# Patient Record
Sex: FEMALE | Race: BLACK OR AFRICAN AMERICAN | NOT HISPANIC OR LATINO | Employment: OTHER | URBAN - METROPOLITAN AREA
[De-identification: names, ages, dates, MRNs, and addresses within clinical notes are randomized per-mention and may not be internally consistent; named-entity substitution may affect disease eponyms.]

---

## 2017-01-20 ENCOUNTER — TELEPHONE (OUTPATIENT)
Dept: OBSTETRICS AND GYNECOLOGY | Facility: CLINIC | Age: 37
End: 2017-01-20

## 2017-01-20 NOTE — TELEPHONE ENCOUNTER
----- Message from Alen Dent sent at 1/20/2017  1:58 PM CST -----  Please call Pharm [facility] MEDICATION USE DOC'S NPI FOR A REF # [phone]

## 2017-09-12 ENCOUNTER — HOSPITAL ENCOUNTER (EMERGENCY)
Facility: OTHER | Age: 37
Discharge: HOME | End: 2017-09-12
Attending: EMERGENCY MEDICINE
Payer: MEDICAID

## 2017-09-12 VITALS
DIASTOLIC BLOOD PRESSURE: 54 MMHG | RESPIRATION RATE: 17 BRPM | HEART RATE: 76 BPM | TEMPERATURE: 98 F | SYSTOLIC BLOOD PRESSURE: 115 MMHG | OXYGEN SATURATION: 100 %

## 2017-09-12 DIAGNOSIS — R40.0 SOMNOLENCE: ICD-10-CM

## 2017-09-12 DIAGNOSIS — F11.90 OPIATE USE: ICD-10-CM

## 2017-09-12 DIAGNOSIS — R40.4 TRANSIENT ALTERATION OF AWARENESS: Primary | ICD-10-CM

## 2017-09-12 LAB
AMPHET+METHAMPHET UR QL: NEGATIVE
ANION GAP SERPL CALC-SCNC: 9 MMOL/L
B-HCG UR QL: NEGATIVE
BARBITURATES UR QL SCN>200 NG/ML: NEGATIVE
BENZODIAZ UR QL SCN>200 NG/ML: NORMAL
BUN SERPL-MCNC: 12 MG/DL
BZE UR QL SCN: NEGATIVE
CALCIUM SERPL-MCNC: 8.9 MG/DL
CANNABINOIDS UR QL SCN: NORMAL
CHLORIDE SERPL-SCNC: 108 MMOL/L
CO2 SERPL-SCNC: 22 MMOL/L
CREAT SERPL-MCNC: 0.7 MG/DL
CREAT UR-MCNC: 201.5 MG/DL
CTP QC/QA: YES
EST. GFR  (AFRICAN AMERICAN): >60 ML/MIN/1.73 M^2
EST. GFR  (NON AFRICAN AMERICAN): >60 ML/MIN/1.73 M^2
ETHANOL SERPL-MCNC: <10 MG/DL
GLUCOSE SERPL-MCNC: 93 MG/DL
METHADONE UR QL SCN>300 NG/ML: NEGATIVE
OPIATES UR QL SCN: NEGATIVE
PCP UR QL SCN>25 NG/ML: NEGATIVE
POTASSIUM SERPL-SCNC: 4.8 MMOL/L
SODIUM SERPL-SCNC: 139 MMOL/L
TOXICOLOGY INFORMATION: NORMAL

## 2017-09-12 PROCEDURE — 99283 EMERGENCY DEPT VISIT LOW MDM: CPT

## 2017-09-12 PROCEDURE — 80320 DRUG SCREEN QUANTALCOHOLS: CPT

## 2017-09-12 PROCEDURE — 81025 URINE PREGNANCY TEST: CPT | Performed by: EMERGENCY MEDICINE

## 2017-09-12 PROCEDURE — 25000003 PHARM REV CODE 250: Performed by: EMERGENCY MEDICINE

## 2017-09-12 PROCEDURE — 80307 DRUG TEST PRSMV CHEM ANLYZR: CPT

## 2017-09-12 PROCEDURE — 80048 BASIC METABOLIC PNL TOTAL CA: CPT

## 2017-09-12 RX ORDER — ONDANSETRON 8 MG/1
8 TABLET, ORALLY DISINTEGRATING ORAL
Status: COMPLETED | OUTPATIENT
Start: 2017-09-12 | End: 2017-09-12

## 2017-09-12 RX ADMIN — ONDANSETRON 8 MG: 8 TABLET, ORALLY DISINTEGRATING ORAL at 05:09

## 2017-09-12 NOTE — ED PROVIDER NOTES
Encounter Date: 2017    SCRIBE #1 NOTE: I, Yoly Alvarez, am scribing for, and in the presence of,  Dr. Suárez. I have scribed the entire note.       History     Chief Complaint   Patient presents with    Addiction Problem     Pt to ED via EMS for AMS secondary to suboxone, and clonopin use.     Time seen by provider: 3:57 PM    This is a 37 y.o. female who presents with drug reaction. As per EMS the patient's symptoms began just prior to arrival in the ED. EMS note a bystander states the patient was lethargic. The patient states she is just tired. She admits to using Suboxone and Clonopin. The patient states she typically takes 1 of Clonopin and 1/2 of Suboxone. However, she admits to taking 2 pills of Clonopin. The patient reports she took the medication because 2 pills came from the bottle. She denies taking pills with the intent to harm herself. The patient denies any other illicit drug use or alcohol. She has no other complaints except for being tired and hungry. The patient does not admit to any chest pain, shortness of breath, palpitations, nausea, vomiting, diarrhea, abdominal pain,        The history is provided by the patient and the EMS personnel. The history is limited by the condition of the patient.     Review of patient's allergies indicates:   Allergen Reactions    Doxycycline      Past Medical History:   Diagnosis Date    Bipolar 1 disorder     Diabetes mellitus     Gastroparesis     Hypertension     Schizophrenia      Past Surgical History:   Procedure Laterality Date     SECTION       Family History   Problem Relation Age of Onset    Bone cancer Mother     Ovarian cancer Neg Hx     Diabetes Neg Hx      Social History   Substance Use Topics    Smoking status: Current Every Day Smoker     Packs/day: 0.50     Types: Cigarettes    Smokeless tobacco: Never Used    Alcohol use No     Review of Systems   Constitutional: Positive for fatigue. Negative for chills and fever.    HENT: Negative for congestion and sore throat.    Eyes: Negative for redness and visual disturbance.   Respiratory: Negative for cough and shortness of breath.    Cardiovascular: Negative for chest pain and palpitations.   Gastrointestinal: Negative for abdominal pain, diarrhea, nausea and vomiting.   Genitourinary: Negative for dysuria.   Musculoskeletal: Negative for back pain.   Skin: Negative for rash.   Neurological: Negative for weakness and headaches.   Psychiatric/Behavioral: Negative for confusion.       Physical Exam     Initial Vitals [09/12/17 1526]   BP Pulse Resp Temp SpO2   106/60 74 18 -- 99 %      MAP       75.33         Physical Exam    Nursing note and vitals reviewed.  Constitutional: She appears well-developed and well-nourished. She is not diaphoretic. No distress.   Responds to verbal stimulus. No odor of alcohol. No slurred speech.    HENT:   Head: Normocephalic and atraumatic.   Right Ear: External ear normal.   Left Ear: External ear normal.   Eyes: Conjunctivae and EOM are normal.   Pupils are 2 mm and sluggish   Neck: Normal range of motion. Neck supple.   Cardiovascular: Normal rate, regular rhythm and normal heart sounds. Exam reveals no gallop and no friction rub.    No murmur heard.  Pulmonary/Chest: Breath sounds normal. She has no wheezes. She has no rhonchi. She has no rales.   Abdominal: Soft. Bowel sounds are normal. There is no tenderness. There is no rebound and no guarding.   Musculoskeletal: Normal range of motion. She exhibits no edema or tenderness.   Lymphadenopathy:     She has no cervical adenopathy.   Neurological: She has normal strength.   Alert to person   Skin: Skin is warm and dry. No rash noted.         ED Course   Procedures  Labs Reviewed   BASIC METABOLIC PANEL - Abnormal; Notable for the following:        Result Value    CO2 22 (*)     All other components within normal limits   DRUG SCREEN PANEL, URINE EMERGENCY   ALCOHOL,MEDICAL (ETHANOL)   CBC W/ AUTO  DIFFERENTIAL   POCT URINE PREGNANCY             Medical Decision Making:   ED Management:  4:05 PM Called [facility] to speak with Dr. Kwan of psychiatry. Spoke with Evelin nurse of  Dr. Kwan, that makes home visits to the patient, went for routine visit, when the patient opened the door, the patient was lethargic, had pressure of 80/60, pupils were pinpoint. The patient told nurse she had taken Suboxone, but told Dr. Kwan she was out of the medication and took  4 Vicodin instead. The nurse states she and the doctor then went to [facility] to buy narcan but the pharmacy did not have the medication, so they returned to the house and found the patient to be even more hypotensive. Had concern the patient would continue to be drowsy, so called EMS    Additional MDM:   Comments: 38 y/o female BIB 2/2 increased somnolence at home today during a visit from Dr. Kwan and his nurse Evelin.  The patient had no complaints other than feeling hungry and sleepy.  She admitted to me that she took 1/2 a tab of suboxone at 9:26 am and 2 1mg tabs of klonopin ~ 1 hour prior to arrival.  The patient was observed on the cardiac monitor throughout her Ed stay without any complications.  She only required zofran for nausea.  Labs were obtained and without significant abnormalities.      Of note, Evelin did provide the history that the patient's child (<1 year old) was in the house alone with her when they arrived and found her somnolent.  The child was left with her sister who lives next door.  The patient reports having 4 children and she is the only adult in the home.  She made comments to the nurse in the ED about leaving the children in the closet at times among other things.  Please see the nurses notes for further details.  CPS was called (case # [id number]) and our charge nurse was told she would be a high priority case.  The patient was d/c'ed home in stable condition.    .          Scribe Attestation:   Scribe #1: I  performed the above scribed service and the documentation accurately describes the services I performed. I attest to the accuracy of the note.    Attending Attestation:           Physician Attestation for Scribe:  Physician Attestation Statement for Scribe #1: I, Dr. Suárez, reviewed documentation, as scribed by Yoly Alvarez in my presence, and it is both accurate and complete.                 ED Course      Clinical Impression:     1. Transient alteration of awareness    2. Somnolence    3. Opiate use                               Jolene Suárez MD  09/12/17 1311

## 2017-09-12 NOTE — ED NOTES
"Upon discharging patient, pt reports she needed to hurry home because her sister who was watching her youngest child left for work and left the baby with pts oldest son because her sister had to go to work. Pt states she has 4 children, oldest of which is 14 years old. Pt states "i'm scared as shit because I already have a case with CPS" related to drugs.   "

## 2017-09-12 NOTE — ED NOTES
CPS states to DC patient, and they will make a priority 1 case, and send staff to patient resident.

## 2017-09-12 NOTE — ED TRIAGE NOTES
"Pt arrived via EMS for lethargy. EMS called by [facility] ACT team while at pt's home (EMS provided team's business card). Pt reported to EMS she took x2 klonapin and x2 suboxone about 1 hr PTA. EMS reports no HI or SI. EMS also reports there was a 9 month old at the house and a neighbor is currently caring for the child. Pt reports at 0600 today she started with "falling down because I wanted to sleep". Pt also reports generalized body pain and nausea. Reports she takes suboxone for "tylenol PM and pain killer" addiction. Denies attempting to injure herself today. Denies any recreational drug use or alcohol use today. Pt reports being "sleepy and hungry". Pt falling asleep during the assessment, pt responds to voice, answers questions appropriately, follows commands. VSS, will continue to monitor. Unable to complete all assessments due to pts lethargic condition.   "

## 2017-09-12 NOTE — ED NOTES
Pt more alert and requesting to go home. Pt also requesting something for her nausea. MD informed and requested pt be ambulated. MD will order zofran. Pt ambulated without difficulty. Will complete patients assessments at this time

## 2017-12-13 ENCOUNTER — HOSPITAL ENCOUNTER (EMERGENCY)
Facility: OTHER | Age: 37
Discharge: HOME | End: 2017-12-13
Attending: EMERGENCY MEDICINE
Payer: MEDICAID

## 2017-12-13 VITALS
RESPIRATION RATE: 18 BRPM | HEART RATE: 72 BPM | WEIGHT: 159 LBS | SYSTOLIC BLOOD PRESSURE: 121 MMHG | TEMPERATURE: 98 F | DIASTOLIC BLOOD PRESSURE: 57 MMHG | HEIGHT: 64 IN | BODY MASS INDEX: 27.14 KG/M2 | OXYGEN SATURATION: 98 %

## 2017-12-13 DIAGNOSIS — O26.899 ABDOMINAL PAIN AFFECTING PREGNANCY: ICD-10-CM

## 2017-12-13 DIAGNOSIS — R10.9 ABDOMINAL PAIN AFFECTING PREGNANCY: ICD-10-CM

## 2017-12-13 DIAGNOSIS — Z3A.11 11 WEEKS GESTATION OF PREGNANCY: Primary | ICD-10-CM

## 2017-12-13 LAB
ABO + RH BLD: NORMAL
ALBUMIN SERPL BCP-MCNC: 4.1 G/DL
ALP SERPL-CCNC: 57 U/L
ALT SERPL W/O P-5'-P-CCNC: 9 U/L
AMPHET+METHAMPHET UR QL: NEGATIVE
ANION GAP SERPL CALC-SCNC: 11 MMOL/L
AST SERPL-CCNC: 14 U/L
B-HCG UR QL: POSITIVE
BACTERIA #/AREA URNS HPF: ABNORMAL /HPF
BARBITURATES UR QL SCN>200 NG/ML: NEGATIVE
BASOPHILS # BLD AUTO: 0.01 K/UL
BASOPHILS NFR BLD: 0.1 %
BENZODIAZ UR QL SCN>200 NG/ML: NEGATIVE
BILIRUB SERPL-MCNC: 0.2 MG/DL
BILIRUB UR QL STRIP: NEGATIVE
BLD GP AB SCN CELLS X3 SERPL QL: NORMAL
BUN SERPL-MCNC: 8 MG/DL
BZE UR QL SCN: NEGATIVE
CALCIUM SERPL-MCNC: 9.5 MG/DL
CANNABINOIDS UR QL SCN: NORMAL
CHLORIDE SERPL-SCNC: 103 MMOL/L
CLARITY UR: ABNORMAL
CO2 SERPL-SCNC: 24 MMOL/L
COLOR UR: YELLOW
CREAT SERPL-MCNC: 0.7 MG/DL
CREAT UR-MCNC: 176.1 MG/DL
CTP QC/QA: YES
DIFFERENTIAL METHOD: ABNORMAL
EOSINOPHIL # BLD AUTO: 0.4 K/UL
EOSINOPHIL NFR BLD: 4 %
ERYTHROCYTE [DISTWIDTH] IN BLOOD BY AUTOMATED COUNT: 13.8 %
EST. GFR  (AFRICAN AMERICAN): >60 ML/MIN/1.73 M^2
EST. GFR  (NON AFRICAN AMERICAN): >60 ML/MIN/1.73 M^2
ETHANOL UR-MCNC: <10 MG/DL
GLUCOSE SERPL-MCNC: 98 MG/DL
GLUCOSE UR QL STRIP: NEGATIVE
HCG INTACT+B SERPL-ACNC: NORMAL MIU/ML
HCT VFR BLD AUTO: 36.4 %
HGB BLD-MCNC: 12.4 G/DL
HGB UR QL STRIP: ABNORMAL
HYALINE CASTS #/AREA URNS LPF: 0 /LPF
KETONES UR QL STRIP: ABNORMAL
LEUKOCYTE ESTERASE UR QL STRIP: NEGATIVE
LIPASE SERPL-CCNC: 13 U/L
LYMPHOCYTES # BLD AUTO: 2.9 K/UL
LYMPHOCYTES NFR BLD: 32.9 %
MCH RBC QN AUTO: 28 PG
MCHC RBC AUTO-ENTMCNC: 34.1 G/DL
MCV RBC AUTO: 82 FL
METHADONE UR QL SCN>300 NG/ML: NEGATIVE
MICROSCOPIC COMMENT: ABNORMAL
MONOCYTES # BLD AUTO: 0.6 K/UL
MONOCYTES NFR BLD: 6.7 %
NEUTROPHILS # BLD AUTO: 5 K/UL
NEUTROPHILS NFR BLD: 56.1 %
NITRITE UR QL STRIP: NEGATIVE
OPIATES UR QL SCN: NEGATIVE
PCP UR QL SCN>25 NG/ML: NEGATIVE
PH UR STRIP: 5 [PH] (ref 5–8)
PLATELET # BLD AUTO: 266 K/UL
PMV BLD AUTO: 9.8 FL
POTASSIUM SERPL-SCNC: 4.1 MMOL/L
PROT SERPL-MCNC: 8.4 G/DL
PROT UR QL STRIP: ABNORMAL
RBC # BLD AUTO: 4.43 M/UL
RBC #/AREA URNS HPF: 2 /HPF (ref 0–4)
SODIUM SERPL-SCNC: 138 MMOL/L
SP GR UR STRIP: >=1.03 (ref 1–1.03)
SQUAMOUS #/AREA URNS HPF: 31 /HPF
TOXICOLOGY INFORMATION: NORMAL
URN SPEC COLLECT METH UR: ABNORMAL
UROBILINOGEN UR STRIP-ACNC: NEGATIVE EU/DL
WBC # BLD AUTO: 8.84 K/UL
WBC #/AREA URNS HPF: 5 /HPF (ref 0–5)

## 2017-12-13 PROCEDURE — 84702 CHORIONIC GONADOTROPIN TEST: CPT

## 2017-12-13 PROCEDURE — 85025 COMPLETE CBC W/AUTO DIFF WBC: CPT

## 2017-12-13 PROCEDURE — 96361 HYDRATE IV INFUSION ADD-ON: CPT

## 2017-12-13 PROCEDURE — 99284 EMERGENCY DEPT VISIT MOD MDM: CPT | Mod: 25

## 2017-12-13 PROCEDURE — 25000003 PHARM REV CODE 250: Performed by: PHYSICIAN ASSISTANT

## 2017-12-13 PROCEDURE — 81025 URINE PREGNANCY TEST: CPT | Performed by: EMERGENCY MEDICINE

## 2017-12-13 PROCEDURE — 80053 COMPREHEN METABOLIC PANEL: CPT

## 2017-12-13 PROCEDURE — 63600175 PHARM REV CODE 636 W HCPCS: Performed by: PHYSICIAN ASSISTANT

## 2017-12-13 PROCEDURE — 86900 BLOOD TYPING SEROLOGIC ABO: CPT

## 2017-12-13 PROCEDURE — 81000 URINALYSIS NONAUTO W/SCOPE: CPT

## 2017-12-13 PROCEDURE — 80307 DRUG TEST PRSMV CHEM ANLYZR: CPT

## 2017-12-13 PROCEDURE — 86850 RBC ANTIBODY SCREEN: CPT

## 2017-12-13 PROCEDURE — 96374 THER/PROPH/DIAG INJ IV PUSH: CPT

## 2017-12-13 PROCEDURE — 83690 ASSAY OF LIPASE: CPT

## 2017-12-13 RX ORDER — ONDANSETRON 4 MG/1
4 TABLET, ORALLY DISINTEGRATING ORAL
Status: DISCONTINUED | OUTPATIENT
Start: 2017-12-13 | End: 2017-12-13

## 2017-12-13 RX ORDER — ONDANSETRON 4 MG/1
4 TABLET, FILM COATED ORAL EVERY 6 HOURS
Qty: 12 TABLET | Refills: 0 | Status: SHIPPED | OUTPATIENT
Start: 2017-12-13 | End: 2018-01-29

## 2017-12-13 RX ORDER — SODIUM CHLORIDE 9 MG/ML
125 INJECTION, SOLUTION INTRAVENOUS CONTINUOUS
Status: DISCONTINUED | OUTPATIENT
Start: 2017-12-13 | End: 2017-12-13

## 2017-12-13 RX ORDER — ACETAMINOPHEN 500 MG
500 TABLET ORAL
Status: COMPLETED | OUTPATIENT
Start: 2017-12-13 | End: 2017-12-13

## 2017-12-13 RX ORDER — ONDANSETRON 2 MG/ML
4 INJECTION INTRAMUSCULAR; INTRAVENOUS
Status: COMPLETED | OUTPATIENT
Start: 2017-12-13 | End: 2017-12-13

## 2017-12-13 RX ORDER — LORAZEPAM 2 MG/ML
INJECTION INTRAMUSCULAR
Status: DISCONTINUED
Start: 2017-12-13 | End: 2017-12-13 | Stop reason: WASHOUT

## 2017-12-13 RX ADMIN — SODIUM CHLORIDE 1000 ML: 0.9 INJECTION, SOLUTION INTRAVENOUS at 02:12

## 2017-12-13 RX ADMIN — ACETAMINOPHEN 500 MG: 500 TABLET ORAL at 02:12

## 2017-12-13 RX ADMIN — ONDANSETRON 4 MG: 2 INJECTION INTRAMUSCULAR; INTRAVENOUS at 02:12

## 2017-12-13 NOTE — ED TRIAGE NOTES
Pt states she is about 12 weeks pregnant and has not been seen by MD yet. States left flank pain and left sided low abdominal pain since yesterday. States feels like labor pain but pt is not bleeding. Denies difficulty urinating.

## 2017-12-13 NOTE — ED NOTES
Upon entering room pt with tense posturing with fixed stare and pin point pupils. Sternum rub performed with limited responsiveness. Staff and PA notified while maintaining safe environment. Episode lasted approx 10 seconds before pt regained relaxed muscle tone, ability to speak, and follow commands. PA at bedside. MD notified.

## 2017-12-13 NOTE — ED PROVIDER NOTES
Encounter Date: 2017       History     Chief Complaint   Patient presents with    GI Problem     n/v/d x 2 days. reports fever of 101.1. generalized abd pain.      Patient is a 37-year-old , approximately 12 weeks gestational age, with bipolar disorder, diabetes, and hypertension who presents to the ED with abdominal pain.  She reports acute onset of bilateral lower abdominal cramping since last night.  She states the pain became worse at 2 AM this morning. She also reports nausea, vomiting, and diarrhea since yesterday.  She states she has vomited 4 times today. She reports one episode of diarrhea today.  She denies bloody emesis or bloody stools.  She states she has not established prenatal care. She denies vaginal bleeding or vaginal discharge.  She states she is taking Suboxone and last took it today.       The history is provided by the patient.     Review of patient's allergies indicates:   Allergen Reactions    Doxycycline      Past Medical History:   Diagnosis Date    Bipolar 1 disorder     Diabetes mellitus     Gastroparesis     Hypertension     Schizophrenia      Past Surgical History:   Procedure Laterality Date     SECTION       Family History   Problem Relation Age of Onset    Bone cancer Mother     Ovarian cancer Neg Hx     Diabetes Neg Hx      Social History   Substance Use Topics    Smoking status: Current Every Day Smoker     Packs/day: 0.50     Types: Cigarettes    Smokeless tobacco: Never Used    Alcohol use No     Review of Systems   Constitutional: Negative for chills and fever.   HENT: Negative for congestion and sore throat.    Eyes: Negative for pain.   Respiratory: Negative for shortness of breath.    Cardiovascular: Negative for chest pain.   Gastrointestinal: Positive for abdominal pain, diarrhea, nausea and vomiting.   Genitourinary: Negative for dysuria.   Musculoskeletal: Negative for arthralgias.   Skin: Negative for rash.   Neurological: Negative for  headaches.       Physical Exam     Initial Vitals [12/13/17 1341]   BP Pulse Resp Temp SpO2   (!) 170/85 84 18 97.8 °F (36.6 °C) 98 %      MAP       113.33         Physical Exam    Constitutional: She is cooperative.   -American female who appears to be in distress.  She is tearful during the exam.   HENT:   Head: Normocephalic and atraumatic.   Mouth/Throat: Oropharynx is clear and moist.   Eyes: Conjunctivae and EOM are normal. Pupils are equal, round, and reactive to light.   Neck: Normal range of motion. Neck supple.   Cardiovascular: Normal rate, regular rhythm and intact distal pulses.   No murmur heard.  Pulmonary/Chest: Breath sounds normal. She has no wheezes. She has no rhonchi. She has no rales.   Abdominal: Soft. Bowel sounds are normal.       Tenderness to palpation to bilateral lower abdomen.  No rebound tenderness or guarding.    Musculoskeletal: Normal range of motion. She exhibits no edema.   Neurological: She is alert and oriented to person, place, and time. She has normal strength. No cranial nerve deficit. GCS eye subscore is 4. GCS verbal subscore is 5. GCS motor subscore is 6.   Skin: Skin is warm and dry. Capillary refill takes less than 2 seconds. No rash noted.   Psychiatric: She has a normal mood and affect. Her behavior is normal.         ED Course   Procedures  Labs Reviewed   URINALYSIS - Abnormal; Notable for the following:        Result Value    Appearance, UA Hazy (*)     Specific Gravity, UA >=1.030 (*)     Protein, UA 2+ (*)     Ketones, UA 1+ (*)     Occult Blood UA Trace (*)     All other components within normal limits   CBC W/ AUTO DIFFERENTIAL - Abnormal; Notable for the following:     Hematocrit 36.4 (*)     All other components within normal limits   COMPREHENSIVE METABOLIC PANEL - Abnormal; Notable for the following:     ALT 9 (*)     All other components within normal limits   URINALYSIS MICROSCOPIC - Abnormal; Notable for the following:     Bacteria, UA Many (*)      All other components within normal limits   POCT URINE PREGNANCY - Abnormal; Notable for the following:     POC Preg Test, Ur Positive (*)     All other components within normal limits   TOXICOLOGY SCREEN, URINE, RANDOM (COMPLIANCE)   HCG, QUANTITATIVE, PREGNANCY   LIPASE   TYPE & SCREEN     Imaging Results          US OB Less Than 14 Wks with Transvag(xpd (Final result)  Result time 12/13/17 15:50:48    Final result by Alvin Alvarenga MD (12/13/17 15:50:48)                 Impression:      Single viable intrauterine pregnancy.  Dates by ultrasound 11 weeks 2 days.      Electronically signed by: ALVIN ALVARENGA  Date:     12/13/17  Time:    15:50              Narrative:    HISTORY:  Abdominal pain.  Dates by last menstrual period 10 weeks 4 days.      TECHNIQUE: Transabdominal and transvaginal obstetrical ultrasound of the pelvis     COMPARISON: None    FINDINGS:  Single viable intrauterine pregnancy seen.  Heart rate is 161 bpm.  Crown-rump length measures 4.4 cm.    Gestational sac measures 4.7 cm.  Uterus is normal in appearance without focal abnormality.    Right ovary measures 4.4 x 2.6 x 3.1 cm.  There is appropriate arterial and venous flow.  Left ovary measures 1.1 x 1.6 x 2.6 cm.  There is appropriate arterial and venous flow.  Corpus luteum is identified on the right ovary measuring 2.2 cm.                                    Medical Decision Making:   Initial Assessment:   Urgent evaluation of a 37 y.o. female presenting to the emergency department complaining of abdominal pain. Patient is afebrile, nontoxic appearing and hemodynamically stable.  ED Management:  Patient is very anxious and tearful on exam, with reported abdominal pain.  UPT is positive.  We will workup to rule out ectopic pregnancy.  Also considered possible withdrawal.  Patient states she was taking Suboxone daily.  Will provide IV fluids, Zofran, and Tylenol for her symptoms.   14:51- I was notified that patient might be having a possible  seizure.  Per nurse, Nannette Martin, patient had fixed, pinpoint pupils and was not responding to sternal rub.  Patient appeared to have muscle spasm but was responsive to questions, commands and made eye contact. After patient took several deep breaths, she no longer was exhibiting this activity.  She was on a postictal state.  I do not believe Ativan was needed at this time.  Of note, patient denies any history of seizures or antiepileptic medications. Upon reviewing old records, patient has history of seizures and pseudoseizures.   Lab work reveals no leukocytosis or anemia, no electrolyte disturbances, normal kidney function. Normal lipase. Urinalysis revealed 5 WBCs with 30 squams. Rh +[place] [address].  Ultrasound reveals single, viable IUP, dating 11 weeks and 2 days. FHT is 161 bpm.   Upon reevaluation, patient is no longer in pain and her nausea has resolved.  She is stable for discharge.  I will send her home with Zofran and have advised her take Tylenol as needed for her pain.  She states she is calling this week to set up an OB appointment.   Other:   I have discussed this case with another health care provider.  This note was created using Dragon Medical Dictation. There may be typographical errors secondary to dictation.               Attending Attestation:     Physician Attestation Statement for NP/PA:   I have conducted a face to face encounter with this patient in addition to the NP/PA, due to    Other NP/PA Attestation Additions:    History of Present Illness: 37-year-old female in early pregnancy who presents with complaint of lower abdominal pain times one day.  She also notes some diarrhea and vomiting today.   Physical Exam: At time of my physical exam there is no abdominal tenderness whatsoever.  Abdomen is soft with no rebound or guarding.   Medical Decision Making: Ultrasound shows a single living IUP, no ectopic, no ovarian torsion.  With all pain resolved I do not feel further imaging is  necessary.  I suspect pain was likely related to her gastroenteritis type symptoms.                 ED Course      Clinical Impression:     1. 11 weeks gestation of pregnancy    2. Abdominal pain affecting pregnancy                             Carlos Ramos PA-C  12/13/17 1813       Sarah Zhang MD  12/13/17 1828

## 2018-01-29 ENCOUNTER — INITIAL PRENATAL (OUTPATIENT)
Dept: OBSTETRICS AND GYNECOLOGY | Facility: CLINIC | Age: 38
End: 2018-01-29
Payer: MEDICAID

## 2018-01-29 VITALS
WEIGHT: 158.31 LBS | DIASTOLIC BLOOD PRESSURE: 78 MMHG | BODY MASS INDEX: 27.17 KG/M2 | SYSTOLIC BLOOD PRESSURE: 120 MMHG

## 2018-01-29 DIAGNOSIS — O34.219 VBAC (VAGINAL BIRTH AFTER CESAREAN): ICD-10-CM

## 2018-01-29 DIAGNOSIS — O09.292 HISTORY OF GESTATIONAL DIABETES MELLITUS (GDM) IN PRIOR PREGNANCY, CURRENTLY PREGNANT IN SECOND TRIMESTER: ICD-10-CM

## 2018-01-29 DIAGNOSIS — O09.299 HISTORY OF GESTATIONAL DIABETES IN PRIOR PREGNANCY, CURRENTLY PREGNANT: ICD-10-CM

## 2018-01-29 DIAGNOSIS — Z87.51 HISTORY OF PRETERM DELIVERY: ICD-10-CM

## 2018-01-29 DIAGNOSIS — K46.9 ABDOMINAL HERNIA WITHOUT OBSTRUCTION AND WITHOUT GANGRENE, RECURRENCE NOT SPECIFIED, UNSPECIFIED HERNIA TYPE: Chronic | ICD-10-CM

## 2018-01-29 DIAGNOSIS — F31.9 BIPOLAR 1 DISORDER: ICD-10-CM

## 2018-01-29 DIAGNOSIS — F19.11 HISTORY OF DRUG ABUSE: ICD-10-CM

## 2018-01-29 DIAGNOSIS — Z72.0 TOBACCO ABUSE: Chronic | ICD-10-CM

## 2018-01-29 DIAGNOSIS — Z34.82 ENCOUNTER FOR SUPERVISION OF OTHER NORMAL PREGNANCY IN SECOND TRIMESTER: Primary | ICD-10-CM

## 2018-01-29 DIAGNOSIS — Z86.32 HISTORY OF GESTATIONAL DIABETES IN PRIOR PREGNANCY, CURRENTLY PREGNANT: ICD-10-CM

## 2018-01-29 DIAGNOSIS — Z86.32 HISTORY OF GESTATIONAL DIABETES MELLITUS (GDM) IN PRIOR PREGNANCY, CURRENTLY PREGNANT IN SECOND TRIMESTER: ICD-10-CM

## 2018-01-29 DIAGNOSIS — Z98.891 HISTORY OF VBAC: ICD-10-CM

## 2018-01-29 DIAGNOSIS — O09.212 HIGH RISK PREGNANCY DUE TO HISTORY OF PRETERM LABOR IN SECOND TRIMESTER: ICD-10-CM

## 2018-01-29 LAB
C TRACH DNA SPEC QL NAA+PROBE: NOT DETECTED
CANDIDA RRNA VAG QL PROBE: NEGATIVE
CREAT UR-MCNC: 269 MG/DL
G VAGINALIS RRNA GENITAL QL PROBE: NEGATIVE
N GONORRHOEA DNA SPEC QL NAA+PROBE: NOT DETECTED
PROT UR-MCNC: 42 MG/DL
PROT/CREAT RATIO, UR: 0.16
T VAGINALIS RRNA GENITAL QL PROBE: NEGATIVE

## 2018-01-29 PROCEDURE — 87086 URINE CULTURE/COLONY COUNT: CPT

## 2018-01-29 PROCEDURE — 88175 CYTOPATH C/V AUTO FLUID REDO: CPT

## 2018-01-29 PROCEDURE — 99213 OFFICE O/P EST LOW 20 MIN: CPT | Mod: TH,S$PBB,, | Performed by: STUDENT IN AN ORGANIZED HEALTH CARE EDUCATION/TRAINING PROGRAM

## 2018-01-29 PROCEDURE — 87491 CHLMYD TRACH DNA AMP PROBE: CPT

## 2018-01-29 PROCEDURE — 87480 CANDIDA DNA DIR PROBE: CPT

## 2018-01-29 PROCEDURE — 99999 PR PBB SHADOW E&M-EST. PATIENT-LVL IV: CPT | Mod: PBBFAC,,, | Performed by: STUDENT IN AN ORGANIZED HEALTH CARE EDUCATION/TRAINING PROGRAM

## 2018-01-29 PROCEDURE — 84156 ASSAY OF PROTEIN URINE: CPT

## 2018-01-29 PROCEDURE — 99214 OFFICE O/P EST MOD 30 MIN: CPT | Mod: PBBFAC,TH,PO | Performed by: STUDENT IN AN ORGANIZED HEALTH CARE EDUCATION/TRAINING PROGRAM

## 2018-01-29 RX ORDER — QUETIAPINE FUMARATE 100 MG/1
TABLET, FILM COATED ORAL
COMMUNITY
Start: 2017-11-10

## 2018-01-29 RX ORDER — BUPRENORPHINE HYDROCHLORIDE, NALOXONE HYDROCHLORIDE 8; 2 MG/1; MG/1
FILM, SOLUBLE BUCCAL; SUBLINGUAL
COMMUNITY
Start: 2018-01-12

## 2018-01-29 RX ORDER — MIRTAZAPINE 30 MG/1
TABLET, FILM COATED ORAL
COMMUNITY
Start: 2018-01-12

## 2018-01-29 NOTE — PROGRESS NOTES
History of numerous problems -- see problem list  Needs MFM consult because of Hx of , labor and delivery,needs progesterone Questionable history of hypertension, Drug abuse on Suboxone. On meds for bipolar disorder Remeron 30 mg, Seroquel 100 mg, Questionable history of seizure disorder, History of multiple relative with breast cancer. Hx of GDM.  Labs ordered

## 2018-01-29 NOTE — PROGRESS NOTES
CC: Absence of menses    Sanket Palmre is a 37 y.o. female  presents with complaint of absence of menstruation.  She reports nausea/vomIting/abdominal pain/bleeding.  UPT is positive.     Past Medical History:   Diagnosis Date    Bipolar 1 disorder     Gastroparesis     Hypertension     Schizophrenia      Past Surgical History:   Procedure Laterality Date     SECTION       Social History     Social History    Marital status: Single     Spouse name: N/A    Number of children: N/A    Years of education: N/A     Social History Main Topics    Smoking status: Current Every Day Smoker     Packs/day: 1.50     Types: Cigarettes    Smokeless tobacco: Never Used    Alcohol use No    Drug use: Yes     Types: Marijuana    Sexual activity: Yes     Partners: Male     Other Topics Concern    None     Social History Narrative    None     Family History   Problem Relation Age of Onset    Bone cancer Mother     Ovarian cancer Neg Hx     Diabetes Neg Hx      OB History    Para Term  AB Living   7 4 3 1 2 4   SAB TAB Ectopic Multiple Live Births   2 0 0 0 4      # Outcome Date GA Lbr Ruddy/2nd Weight Sex Delivery Anes PTL Lv   7 Current            6 Term 16 38w6d  2.75 kg (6 lb 1 oz) F  EPI N [place]   5 2014           4 2014     SAB      3 Term 09 40w0d  2.637 kg (5 lb 13 oz) F Vag-Spont   [place]      Complications: Gestational diabetes   2  09/15/04 32w0d  1.786 kg (3 lb 15 oz) M Vag-Spont  Y [place]   1 Term 02 40w0d  3.997 kg (8 lb 13 oz) M CS-LTranv   [place]      Complications: Breech presentation at birth          /78   Wt 71.8 kg (158 lb 4.6 oz)   LMP 2017   BMI 27.17 kg/m²     ROS:  GENERAL: Denies weight gain or weight loss. Feeling well overall.   SKIN: Denies rash or lesions.   HEAD: Denies head injury or headache.   NODES: Denies enlarged lymph nodes.   CHEST: Denies chest pain or shortness of breath.   CARDIOVASCULAR: Denies palpitations  or left sided chest pain.   ABDOMEN: No abdominal pain, constipation, diarrhea, nausea, vomiting or rectal bleeding.   URINARY: No frequency, dysuria, hematuria, or burning on urination.  REPRODUCTIVE: See HPI.   BREASTS: The patient performs breast self-examination and denies pain, lumps, or nipple discharge.   HEMATOLOGIC: No easy bruisability or excessive bleeding.   MUSCULOSKELETAL: Denies joint pain or swelling.   NEUROLOGIC: Denies syncope or weakness.   PSYCHIATRIC: Denies depression, anxiety or mood swings.    PE:   APPEARANCE: Well nourished, well developed, in no acute distress.  AFFECT: WNL, alert and oriented x 3.  SKIN: No acne or hirsutism.  NECK: Neck symmetric without masses or thyromegaly.  NODES: No inguinal, cervical, axillary or femoral lymph node enlargement.  CHEST: Good respiratory effort.   ABDOMEN: Soft. No tenderness or masses. No hepatosplenomegaly. No hernias.  BREASTS: Symmetrical, no skin changes or visible lesions. No palpable masses, nipple discharge bilaterally.  PELVIC: Normal external female genitalia without lesions. Normal hair distribution. Adequate perineal body, normal urethral meatus. Vagina moist and well rugated without lesions or discharge. Cervix pink, without lesions, discharge or tenderness. No significant cystocele or rectocele. Bimanual exam shows uterus is nontender. Adnexa without masses or tenderness.  EXTREMITIES: No edema.          ASSESSMENT and PLAN:    ICD-10-CM ICD-9-CM    1. Encounter for supervision of other high risk pregnancy in second trimester Z34.82 V22.1 HIV-1 and HIV-2 antibodies      RPR      Hemoglobin Electrophoresis,Hgb A2 Yariel.      Liquid-based pap smear, screening      Hepatitis B surface antigen      Type & Screen - Ob Profile      Rubella antibody, IgG      C. trachomatis/N. gonorrhoeae by AMP DNA Cervicovaginal      US MFM Procedure ([facility])      Urine culture      Hemoglobin A1c      Protein / creatinine ratio, urine      Ambulatory  consult to Perinatology      Ambulatory consult to Neurology Epilepsy      Ambulatory Referral to Breast Surgery      POCT Vaginosis Screen by DNA Probe ([facility])      OB Glucose Screen      Vaginosis Screen by DNA Probe   2. History of  x2  Z98.891 V13.29 Vaginosis Screen by DNA Probe   3.  (vaginal birth after ) O34.219 654.21 Vaginosis Screen by DNA Probe   4. History of gestational diabetes in prior pregnancy, currently pregnant O09.299 V23.49 POCT Vaginosis Screen by DNA Probe ([facility]irm)    Z86.32  OB Glucose Screen      Vaginosis Screen by DNA Probe   5. Bipolar 1 disorder on seroquel, remeron F31.9 296.7 Vaginosis Screen by DNA Probe   6. Tobacco abuse Z72.0 305.1 Vaginosis Screen by DNA Probe   7. Abdominal hernia without obstruction and without gangrene, recurrence not specified, unspecified hernia type K46.9 553.20 Vaginosis Screen by DNA Probe   8. High risk pregnancy due to history of  labor in second trimester O09.212 V23.41 Vaginosis Screen by DNA Probe   9. History of drug abuse- on suboxone Z87.898 305.93    10. GDM in previous preg O09.292 V23.49     Z86.32     11. H/o PTD at 28 wks; needs IM progesterone Z87.51 V13.21          Patient was counseled today on proper weight gain based on the [place] [facility]'s recommendations based on her pre-pregnancy weight. Discussed foods to avoid in pregnancy (i.e. sushi, fish that are high in mercury, deli meat, and unpasteurized cheeses). Discussed prenatal vitamin options (i.e. stool softener, DHA). Contingency screen offered - patient desires.  - consult placed to neurology epilepsy clinic for evaluation of h/o seizures/pseudoseizures  - consult placed to breast clinic for strong family h/o breast cancer (including a male cousin and her mother who passed recently from breast cancer)  - MFM consult placed for high risk pregnancy (h/o GDM per pt with every pregnancy, h/o  delivery, h/o seizures/pseudoseizures, h/o bipolar  disorder/schizophrenia  - initial labs and SPADD  -rtc in 1 week for eval of n/v, weight changes, improvement with recommended bryce or need for further medication.  Follow-up in about 1 week (around 2/5/2018).

## 2018-01-31 LAB
BACTERIA UR CULT: NORMAL
BACTERIA UR CULT: NORMAL

## 2018-02-01 ENCOUNTER — LAB VISIT (OUTPATIENT)
Dept: LAB | Facility: OTHER | Age: 38
End: 2018-02-01
Payer: MEDICAID

## 2018-02-01 DIAGNOSIS — Z34.82 ENCOUNTER FOR SUPERVISION OF OTHER NORMAL PREGNANCY IN SECOND TRIMESTER: ICD-10-CM

## 2018-02-01 LAB
ABO + RH BLD: NORMAL
BLD GP AB SCN CELLS X3 SERPL QL: NORMAL
ESTIMATED AVG GLUCOSE: 108 MG/DL
HBA1C MFR BLD HPLC: 5.4 %

## 2018-02-01 PROCEDURE — 83036 HEMOGLOBIN GLYCOSYLATED A1C: CPT

## 2018-02-01 PROCEDURE — 86592 SYPHILIS TEST NON-TREP QUAL: CPT

## 2018-02-01 PROCEDURE — 86703 HIV-1/HIV-2 1 RESULT ANTBDY: CPT

## 2018-02-01 PROCEDURE — 83020 HEMOGLOBIN ELECTROPHORESIS: CPT

## 2018-02-01 PROCEDURE — 87340 HEPATITIS B SURFACE AG IA: CPT

## 2018-02-01 PROCEDURE — 86850 RBC ANTIBODY SCREEN: CPT

## 2018-02-01 PROCEDURE — 86762 RUBELLA ANTIBODY: CPT

## 2018-02-02 LAB
HBV SURFACE AG SERPL QL IA: NEGATIVE
HIV 1+2 AB+HIV1 P24 AG SERPL QL IA: NEGATIVE
RPR SER QL: NORMAL
RUBV IGG SER-ACNC: 42.1 IU/ML
RUBV IGG SER-IMP: REACTIVE

## 2018-02-06 LAB
HGB A2 MFR BLD HPLC: 2.7 %
HGB FRACT BLD ELPH-IMP: NORMAL
HGB FRACT BLD ELPH-IMP: NORMAL

## 2018-02-07 ENCOUNTER — OFFICE VISIT (OUTPATIENT)
Dept: MATERNAL FETAL MEDICINE | Facility: CLINIC | Age: 38
End: 2018-02-07
Attending: OBSTETRICS & GYNECOLOGY
Payer: MEDICAID

## 2018-02-07 VITALS — BODY MASS INDEX: 27.55 KG/M2 | SYSTOLIC BLOOD PRESSURE: 118 MMHG | WEIGHT: 160.5 LBS | DIASTOLIC BLOOD PRESSURE: 74 MMHG

## 2018-02-07 DIAGNOSIS — Z34.82 ENCOUNTER FOR SUPERVISION OF OTHER NORMAL PREGNANCY IN SECOND TRIMESTER: ICD-10-CM

## 2018-02-07 PROCEDURE — 99204 OFFICE O/P NEW MOD 45 MIN: CPT | Mod: S$PBB,TH,25, | Performed by: OBSTETRICS & GYNECOLOGY

## 2018-02-07 PROCEDURE — 76811 OB US DETAILED SNGL FETUS: CPT | Mod: PBBFAC | Performed by: OBSTETRICS & GYNECOLOGY

## 2018-02-07 PROCEDURE — 99999 PR PBB SHADOW E&M-EST. PATIENT-LVL II: CPT | Mod: PBBFAC,,, | Performed by: OBSTETRICS & GYNECOLOGY

## 2018-02-07 PROCEDURE — 99212 OFFICE O/P EST SF 10 MIN: CPT | Mod: PBBFAC,TH,25 | Performed by: OBSTETRICS & GYNECOLOGY

## 2018-02-07 PROCEDURE — 76811 OB US DETAILED SNGL FETUS: CPT | Mod: 26,S$PBB,, | Performed by: OBSTETRICS & GYNECOLOGY

## 2018-02-07 PROCEDURE — 3008F BODY MASS INDEX DOCD: CPT | Mod: ,,, | Performed by: OBSTETRICS & GYNECOLOGY

## 2018-02-07 NOTE — PROGRESS NOTES
Chief complaint: Multiple medication exposure, opioid use,  CHTN, AMA    Provider requesting consultation: Dr. Alvarado    37 y.o. [id number] 19w2d EGA.    PMH:  Past Medical History:   Diagnosis Date    Bipolar 1 disorder     Gastroparesis     Hypertension     Schizophrenia        PObHx:  OB History    Para Term  AB Living   7 4 3 1 2 4   SAB TAB Ectopic Multiple Live Births   2 0 0 0 4      # Outcome Date GA Lbr Ruddy/2nd Weight Sex Delivery Anes PTL Lv   7 Current            6 Term 16 38w6d  2.75 kg (6 lb 1 oz) F  EPI N [place]   5 2014           4 2014     SAB      3 Term 09 40w0d  2.637 kg (5 lb 13 oz) F Vag-Spont   [place]      Complications: Gestational diabetes   2  09/15/04 32w0d  1.786 kg (3 lb 15 oz) M Vag-Spont  Y [place]   1 Term 02 40w0d  3.997 kg (8 lb 13 oz) M CS-LTranv   [place]      Complications: Breech presentation at birth          PSH:  Past Surgical History:   Procedure Laterality Date     SECTION         Family history:family history includes Bone cancer in her mother.    Social history: reports that she has been smoking Cigarettes.  She has been smoking about 1.50 packs per day. She has never used smokeless tobacco. She reports that she uses drugs, including Marijuana. She reports that she does not drink alcohol.    A detailed fetal anatomical ultrasound was completed today.  See details in imaging section of EPIC.      Age based risk for Down syndrome at this gestational age is approximately 1 in 270  Aneuploidy screening this pregnancy not done        Today the patient was counseled on the relationship between maternal age and genetic aneuploidy.  The patient was counseled on the risks and benefits of screening tests versus definitive genetic testing (amniocentesis). Patient was counseled about her specific age related risk of Down Syndrome. We discussed the limitations of ultrasound in the definitive diagnosis of Down Syndrome and we discussed  amniocentesis as providing definitive diagnosis.  I quoted the patient a 1 in 1000 procedure related risk of fetal loss with genetic amniocentesis. After today's consultation she  did not want to pursue genetic amniocentesis.    We also discussed non-invasive prenatal diagnosis(NIPD) for trisomy 21, 18 and 13 through free fetal DNA in maternal serum. Non-invasive prenatal diagnosis poses no fetal risks and is done through an in office blood draw. The majority of circulating cell-free fetal DNA in maternal serum comes from placental cells, therefore a small risk, similar to that seen for CVS results, exists for obtaining a result that may represent confined placental mosaicism.  Non-invasive prenatal diagnosis is available from 10 weeks gestation throughout the remainder of the pregnancy. Typically results are available within 8-10 days and are 99% sensitive and specific for trisomy 21 and trisomy 18. Results are 91 % sensitive and >99% specific for trisomy 13. There is no detailed information about the structure of chromosomes 21, 18 or 13, only the relative copy number. Follow-up diagnostic testing through CVS or amniocentesis is recommended for any abnormal result.  She decided to pursue Materni-21 but couldn't stay for the lab today.  She was given a lab slip and will get it done tomorrow.      -We also reviewed that AMA is associated with an increased risk of adverse pregnancy outcomes such as miscarriage, ectopic pregnancy, diabetes, hypertension, and other adverse outcomes. There is also an increased risk of stillibirth, particularly in women age 40 and above.    -[facility] recommends the following for women 35 and older at their [facility]:   -Detailed fetal anatomic survey at 19 to 20 weeks gestation   -Ultrasound for fetal growth at 32 to 34 weeks gestation     She also has a rather extensive psychiatric history of opioid abuse now on suboxone, major depressive disorder, schizophrenia and bipolar disorder.   She presently takes Remeron and Seroquel.  I informed her that neither of these drugs has been associated with fetal birth defects.  She also has a history of a seizure disorder and by her account had a seizure yesterday and has been having many more since she got pregnant.  She should be placed back on her seizure medication.  Her PCPs are no longer in town.  Please assist her in establishing care with an internist or neurologist to effect this.      At this point the patient had to leave to care for her other children and the consult could not be completed any further.  Did not have time to discuss her CHTN history.      The patient was given an opportunity to ask questions about management and the diease process.  She expressed an understanding of and agreement to the above impression and plan. All questions were answered to her satisfaction.  She was given contact information to the [facility] clinic to address further concerns.      The approximate physician face-to-face time was 45 minutes. The majority of the time (>50%) was spent on counseling of the patient or coordination of care.

## 2018-02-07 NOTE — LETTER
February 7, 2018      Sushma K Reddy, MD  [address]  [facility] [address]           [facility]  [address]  [facility] [address]  Phone: [phone]          Patient: Sanket Palmer   MR Number: [id number]   YOB: 1980   Date of Visit: 2/7/2018       Dear Dr. Sushma K Reddy:    Thank you for referring Sanket Palmer to me for evaluation. Attached you will find relevant portions of my assessment and plan of care.    If you have questions, please do not hesitate to call me. I look forward to following Sanket Palmer along with you.    Sincerely,    Angel Arredondo MD    Enclosure  CC:  No Recipients    If you would like to receive this communication electronically, please contact [email or web address] or [phone] to request more information on [employer] Link access.    For providers and/or their staff who would like to refer a patient to [facility], please contact us through our one-stop-shop provider referral line, [facility] , at [phone].    If you feel you have received this communication in error or would no longer like to receive these types of communications, please e-mail [email or web address]

## 2018-03-10 ENCOUNTER — HOSPITAL ENCOUNTER (EMERGENCY)
Facility: OTHER | Age: 38
Discharge: HOME | End: 2018-03-10
Attending: OBSTETRICS & GYNECOLOGY
Payer: MEDICAID

## 2018-03-10 VITALS — OXYGEN SATURATION: 100 % | DIASTOLIC BLOOD PRESSURE: 84 MMHG | HEART RATE: 111 BPM | SYSTOLIC BLOOD PRESSURE: 142 MMHG

## 2018-03-10 DIAGNOSIS — K59.01 CONSTIPATION BY DELAYED COLONIC TRANSIT: Primary | ICD-10-CM

## 2018-03-10 DIAGNOSIS — R10.9 FLANK PAIN: ICD-10-CM

## 2018-03-10 DIAGNOSIS — Z98.891 HISTORY OF VBAC: ICD-10-CM

## 2018-03-10 DIAGNOSIS — E87.6 HYPOKALEMIA: ICD-10-CM

## 2018-03-10 DIAGNOSIS — Z3A.23 23 WEEKS GESTATION OF PREGNANCY: ICD-10-CM

## 2018-03-10 DIAGNOSIS — I10 ESSENTIAL HYPERTENSION: Chronic | ICD-10-CM

## 2018-03-10 LAB
ALBUMIN SERPL BCP-MCNC: 3.1 G/DL
ALP SERPL-CCNC: 56 U/L
ALT SERPL W/O P-5'-P-CCNC: 12 U/L
AMYLASE SERPL-CCNC: 40 U/L
ANION GAP SERPL CALC-SCNC: 12 MMOL/L
AST SERPL-CCNC: 14 U/L
BASOPHILS # BLD AUTO: 0.01 K/UL
BASOPHILS NFR BLD: 0.1 %
BILIRUB SERPL-MCNC: 0.8 MG/DL
BUN SERPL-MCNC: 7 MG/DL
CALCIUM SERPL-MCNC: 8.5 MG/DL
CHLORIDE SERPL-SCNC: 102 MMOL/L
CO2 SERPL-SCNC: 21 MMOL/L
CREAT SERPL-MCNC: 0.6 MG/DL
DIFFERENTIAL METHOD: ABNORMAL
EOSINOPHIL # BLD AUTO: 0 K/UL
EOSINOPHIL NFR BLD: 0.4 %
ERYTHROCYTE [DISTWIDTH] IN BLOOD BY AUTOMATED COUNT: 13.1 %
EST. GFR  (AFRICAN AMERICAN): >60 ML/MIN/1.73 M^2
EST. GFR  (NON AFRICAN AMERICAN): >60 ML/MIN/1.73 M^2
GLUCOSE SERPL-MCNC: 104 MG/DL
HCT VFR BLD AUTO: 32.7 %
HGB BLD-MCNC: 11.3 G/DL
LIPASE SERPL-CCNC: 12 U/L
LYMPHOCYTES # BLD AUTO: 1.8 K/UL
LYMPHOCYTES NFR BLD: 21.6 %
MCH RBC QN AUTO: 28.6 PG
MCHC RBC AUTO-ENTMCNC: 34.6 G/DL
MCV RBC AUTO: 83 FL
MONOCYTES # BLD AUTO: 0.8 K/UL
MONOCYTES NFR BLD: 9.4 %
NEUTROPHILS # BLD AUTO: 5.8 K/UL
NEUTROPHILS NFR BLD: 68.3 %
PLATELET # BLD AUTO: 257 K/UL
PMV BLD AUTO: 9.9 FL
POTASSIUM SERPL-SCNC: 2.8 MMOL/L
PROT SERPL-MCNC: 6.8 G/DL
RBC # BLD AUTO: 3.95 M/UL
SODIUM SERPL-SCNC: 135 MMOL/L
WBC # BLD AUTO: 8.5 K/UL

## 2018-03-10 PROCEDURE — 80053 COMPREHEN METABOLIC PANEL: CPT

## 2018-03-10 PROCEDURE — 85025 COMPLETE CBC W/AUTO DIFF WBC: CPT

## 2018-03-10 PROCEDURE — 63600175 PHARM REV CODE 636 W HCPCS: Performed by: OBSTETRICS & GYNECOLOGY

## 2018-03-10 PROCEDURE — 83690 ASSAY OF LIPASE: CPT

## 2018-03-10 PROCEDURE — 25000003 PHARM REV CODE 250: Performed by: STUDENT IN AN ORGANIZED HEALTH CARE EDUCATION/TRAINING PROGRAM

## 2018-03-10 PROCEDURE — 99284 EMERGENCY DEPT VISIT MOD MDM: CPT | Mod: ,,, | Performed by: OBSTETRICS & GYNECOLOGY

## 2018-03-10 PROCEDURE — 82150 ASSAY OF AMYLASE: CPT

## 2018-03-10 PROCEDURE — 96374 THER/PROPH/DIAG INJ IV PUSH: CPT

## 2018-03-10 PROCEDURE — 99285 EMERGENCY DEPT VISIT HI MDM: CPT | Mod: 25

## 2018-03-10 PROCEDURE — 96375 TX/PRO/DX INJ NEW DRUG ADDON: CPT

## 2018-03-10 PROCEDURE — 25000003 PHARM REV CODE 250: Performed by: OBSTETRICS & GYNECOLOGY

## 2018-03-10 RX ORDER — POTASSIUM CHLORIDE 20 MEQ/1
40 TABLET, EXTENDED RELEASE ORAL ONCE
Status: DISCONTINUED | OUTPATIENT
Start: 2018-03-10 | End: 2018-03-10 | Stop reason: HOSPADM

## 2018-03-10 RX ORDER — ONDANSETRON 2 MG/ML
8 INJECTION INTRAMUSCULAR; INTRAVENOUS ONCE
Status: COMPLETED | OUTPATIENT
Start: 2018-03-10 | End: 2018-03-10

## 2018-03-10 RX ORDER — NIFEDIPINE 10 MG/1
10 CAPSULE ORAL ONCE
Status: COMPLETED | OUTPATIENT
Start: 2018-03-10 | End: 2018-03-10

## 2018-03-10 RX ADMIN — PROMETHAZINE HYDROCHLORIDE 25 MG: 25 INJECTION INTRAMUSCULAR; INTRAVENOUS at 06:03

## 2018-03-10 RX ADMIN — ONDANSETRON HYDROCHLORIDE 8 MG: 2 INJECTION, SOLUTION INTRAMUSCULAR; INTRAVENOUS at 07:03

## 2018-03-10 RX ADMIN — NIFEDIPINE 10 MG: 10 CAPSULE, LIQUID FILLED ORAL at 06:03

## 2018-03-10 RX ADMIN — SODIUM CHLORIDE, SODIUM LACTATE, POTASSIUM CHLORIDE, AND CALCIUM CHLORIDE 1000 ML: .6; .31; .03; .02 INJECTION, SOLUTION INTRAVENOUS at 05:03

## 2018-03-10 NOTE — ED PROVIDER NOTES
Encounter Date: 3/10/2018       History     Chief Complaint   Patient presents with    Abdominal Pain     Sanket Palmer is a 37 y.o. [id number] at 23w5d presents complaining of abdominal pain x 3 days and nausea/vomiting x 2 days as well as intermittent fevers and chills. She also states she has not had a BM in 5 days and has not passed gas in 3 days. She states her last bowel movement was normal and had no blood in it. She describes her abdominal pain as coming from the right side of her back and radiating to the right side of her abdomen. As for the nausea and vomiting, she has not been able to keep anything down, food or water, for the last two days. She denies dysuria, hematuria, hematemesis, or melena/hematochezia.   Patient denies contractions, denies vaginal bleeding, denies LOF.   Fetal Movement: normal.  This IUP is complicated by late prenatal care, noncompliance with prenatal care (had two no-shows), tobacco use, marijuana use, cHTN, psychiatric conditions (BPD1, schizophrenia) and AMA.          Review of patient's allergies indicates:   Allergen Reactions    Doxycycline Shortness Of Breath and Swelling     Past Medical History:   Diagnosis Date    Bipolar 1 disorder     Gastroparesis     Hypertension     Schizophrenia      Past Surgical History:   Procedure Laterality Date     SECTION       Family History   Problem Relation Age of Onset    Bone cancer Mother     Ovarian cancer Neg Hx     Diabetes Neg Hx      Social History   Substance Use Topics    Smoking status: Current Every Day Smoker     Packs/day: 1.50     Types: Cigarettes    Smokeless tobacco: Never Used    Alcohol use No     Review of Systems   Constitutional: Negative for chills and fever.   Eyes: Negative for visual disturbance.   Respiratory: Negative for chest tightness and shortness of breath.    Gastrointestinal: Positive for abdominal pain, constipation, nausea and vomiting. Negative for blood in stool and diarrhea.    Genitourinary: Negative for dysuria, hematuria, vaginal bleeding and vaginal discharge.   Neurological: Negative for headaches.       Physical Exam     Initial Vitals   BP Pulse Resp Temp SpO2   03/10/18 1725 03/10/18 1725 -- -- 03/10/18 1726   (!) 158/91 90   100 %      MAP       03/10/18 1725       113.33         Physical Exam    Vitals reviewed.  Constitutional: She appears well-developed and well-nourished.   HENT:   Head: Normocephalic and atraumatic.   Eyes: EOM are normal.   Neck: Normal range of motion.   Cardiovascular: Normal rate, regular rhythm and normal heart sounds.   Pulmonary/Chest: Breath sounds normal. No respiratory distress. She has no wheezes. She has no rhonchi. She has no rales.   Abdominal: Soft. There is tenderness (epigastric tenderness, no tenderness elsewhere in the abdomen; rectus diastasis noted but no hernias, no rebound or guarding). There is no rebound and no guarding.   Genitourinary:   Genitourinary Comments: No CVA tenderness   Neurological: She is alert and oriented to person, place, and time. She has normal reflexes.   Skin: Skin is warm and dry.   Psychiatric: She has a normal mood and affect. Her behavior is normal. Judgment and thought content normal.         ED Course   Procedures  Labs Reviewed   CBC W/ AUTO DIFFERENTIAL - Abnormal; Notable for the following:        Result Value    RBC 3.95 (*)     Hemoglobin 11.3 (*)     Hematocrit 32.7 (*)     All other components within normal limits   COMPREHENSIVE METABOLIC PANEL - Abnormal; Notable for the following:     Sodium 135 (*)     Potassium 2.8 (*)     CO2 21 (*)     Calcium 8.5 (*)     Albumin 3.1 (*)     All other components within normal limits   AMYLASE   LIPASE             Medical Decision Making:   ED Management:  FHT verified  VSS except for elevated Bp. Patient is moaning/screaming intermittently in pain, intermittently asleep/quiet.  H/o CHTN  No concerning findings on physical exam including abdominal exam, CVA  exam.   CBC, CMP, amylase, lipase drawn; all wnl except for hypokalemia of 2.8. PO potassium given.  IV zofran given for nausea  US done to assess for renal stones; negative  SVE: 1/20/-3  Patient had to leave as her children were home alone and are all under the age of 8. Message sent to patient's primary Ob, the [facility], discussing need for close follow up of elevated blood pressures and hypokalemia. Will call patient to discuss need for close follow up.  Other:   I have discussed this case with another health care provider.              Attending Attestation:   Physician Attestation Statement for Resident:  As the supervising MD   Physician Attestation Statement: I have personally seen and examined this patient.   I agree with the above history. -:   As the supervising MD I agree with the above PE.    As the supervising MD I agree with the above treatment, course, plan, and disposition.  I was personally present during the critical portions of the procedure(s) performed by the resident and was immediately available in the ED to provide services and assistance as needed during the entire procedure.  I have reviewed and agree with the residents interpretation of the following: lab data.  I have reviewed the following: old records at this facility.                    ED Course as of Mar 10 2218   Sat Mar 10, 2018   1757 I evaluated Ms. Palmer and discussed plan with Dr. Morales.  Pt presents at 23 weeks with severe abdominal pain that she has had for the last 3 days.  Pregnancy complicated by HTN, multiple psych disorders, polysubstance abuse, tobacco abuse.  She states that it has worsened over that time.  Assd with N/V and fevers that go up and down without medicine.  She states that she has neighbors as sick contacts who had a GI illness.  She has been constipated as well.    FHT verified , toco not picking up contractions. Generally, pt is quiet and relaxed, but has periods when she is yelling and  moaning for help.  Lungs CTAB, heart RRR.  No CVA ttp, abdomen benign, no rebound or guarding.  + epigastric tenderness.  Cervix 3  Will get CBC, CMP, amylase, lipase.  US for renal stone. IV fluids. Fleets enema.  Phenergan for N/V/abdominal pain.   Plan discussed w pt who was in agreement  []    When asked about abdominal pain, pt states that she is still nauseas.  Will give zofran IV.  Review of labs so far shows normal CBC, CMP with NA low at 135, K 2.8 - otherwise normal.  Will repleate potassium po, then IV if she vomits it up.  Amylase and lipase normal  []    Labs reviewed w pt, but she has to leave urgently.  Her  yo had been left at home alone.  She will be discharged in stable condition.   Will call at a later time to see what pharmacy can send potassium, and she needs antihypertensives as well.  She will need close follow up  []      ED Course User Index  [HU] Harriett Worthington DO     Clinical Impression:   The primary encounter diagnosis was Constipation by delayed colonic transit. Diagnoses of Flank pain, History of  x2, Essential hypertension, Hypokalemia, and 23 weeks gestation of pregnancy were also pertinent to this visit.    Disposition:   Disposition: Discharged  Condition: Stable                        Sushma K Reddy, MD  Resident  [id number]       Harriett Worthington DO  [phone]

## 2018-03-10 NOTE — ED TRIAGE NOTES
Pt presents to [facility] via EMS with a c/o abdominal pain x 3 days.  Dr. Worthington notified of pt arrival.  Pt to [facility] for evaluation.

## 2018-03-14 ENCOUNTER — TELEPHONE (OUTPATIENT)
Dept: OBSTETRICS AND GYNECOLOGY | Facility: HOSPITAL | Age: 38
End: 2018-03-14

## 2018-03-14 RX ORDER — POTASSIUM CHLORIDE 750 MG/1
40 TABLET, EXTENDED RELEASE ORAL EVERY 4 HOURS
Qty: 8 TABLET | Refills: 0 | Status: SHIPPED | OUTPATIENT
Start: 2018-03-14 | End: 2018-03-15

## 2018-03-14 NOTE — TELEPHONE ENCOUNTER
Called patient to discuss low potassium level in triage. Discussed that I will send potassium to her pharmacy. Pharmacy confirmed. Patient voices understanding and agreement. Also discussed that she has an appointment tomorrow at 10am. Patient is aware and planning to attend appointment. Discussed the importance of attending and that she will need blood pressure checked due to elevated blood pressures in [facility] (patient has CHTN on no medications).

## 2019-01-07 PROBLEM — Z86.32 HISTORY OF GESTATIONAL DIABETES IN PRIOR PREGNANCY, CURRENTLY PREGNANT: Status: RESOLVED | Noted: 2018-01-29 | Resolved: 2019-01-07

## 2019-01-07 PROBLEM — O09.299 HISTORY OF GESTATIONAL DIABETES IN PRIOR PREGNANCY, CURRENTLY PREGNANT: Status: RESOLVED | Noted: 2018-01-29 | Resolved: 2019-01-07

## 2019-06-16 ENCOUNTER — HOSPITAL ENCOUNTER (EMERGENCY)
Facility: HOSPITAL | Age: 39
Discharge: HOME | End: 2019-06-16
Attending: EMERGENCY MEDICINE
Payer: MEDICAID

## 2019-06-16 VITALS
TEMPERATURE: 99 F | HEART RATE: 104 BPM | OXYGEN SATURATION: 100 % | DIASTOLIC BLOOD PRESSURE: 98 MMHG | SYSTOLIC BLOOD PRESSURE: 174 MMHG | BODY MASS INDEX: 29.3 KG/M2 | WEIGHT: 175.88 LBS | RESPIRATION RATE: 20 BRPM | HEIGHT: 65 IN

## 2019-06-16 DIAGNOSIS — K31.84 GASTROPARESIS: ICD-10-CM

## 2019-06-16 DIAGNOSIS — R11.2 NON-INTRACTABLE VOMITING WITH NAUSEA, UNSPECIFIED VOMITING TYPE: ICD-10-CM

## 2019-06-16 DIAGNOSIS — R11.2 NAUSEA & VOMITING: ICD-10-CM

## 2019-06-16 DIAGNOSIS — F31.9 BIPOLAR 1 DISORDER: Primary | ICD-10-CM

## 2019-06-16 DIAGNOSIS — R41.0 CONFUSION: ICD-10-CM

## 2019-06-16 LAB
ALBUMIN SERPL BCP-MCNC: 4.2 G/DL (ref 3.5–5.2)
ALP SERPL-CCNC: 74 U/L (ref 55–135)
ALT SERPL W/O P-5'-P-CCNC: 12 U/L (ref 10–44)
AMPHET+METHAMPHET UR QL: NEGATIVE
ANION GAP SERPL CALC-SCNC: 13 MMOL/L (ref 8–16)
AST SERPL-CCNC: 21 U/L (ref 10–40)
B-HCG UR QL: NEGATIVE
BACTERIA #/AREA URNS HPF: ABNORMAL /HPF
BARBITURATES UR QL SCN>200 NG/ML: NEGATIVE
BASOPHILS # BLD AUTO: 0.01 K/UL (ref 0–0.2)
BASOPHILS NFR BLD: 0.1 % (ref 0–1.9)
BENZODIAZ UR QL SCN>200 NG/ML: NEGATIVE
BILIRUB SERPL-MCNC: 0.3 MG/DL (ref 0.1–1)
BILIRUB UR QL STRIP: NEGATIVE
BUN SERPL-MCNC: 9 MG/DL (ref 6–20)
BZE UR QL SCN: NEGATIVE
CALCIUM SERPL-MCNC: 9.4 MG/DL (ref 8.7–10.5)
CANNABINOIDS UR QL SCN: NORMAL
CHLORIDE SERPL-SCNC: 104 MMOL/L (ref 95–110)
CLARITY UR: CLEAR
CO2 SERPL-SCNC: 19 MMOL/L (ref 23–29)
COLOR UR: YELLOW
CREAT SERPL-MCNC: 0.8 MG/DL (ref 0.5–1.4)
CREAT UR-MCNC: 168.5 MG/DL (ref 15–325)
DIFFERENTIAL METHOD: ABNORMAL
EOSINOPHIL # BLD AUTO: 0 K/UL (ref 0–0.5)
EOSINOPHIL NFR BLD: 0 % (ref 0–8)
ERYTHROCYTE [DISTWIDTH] IN BLOOD BY AUTOMATED COUNT: 13.7 % (ref 11.5–14.5)
EST. GFR  (AFRICAN AMERICAN): >60 ML/MIN/1.73 M^2
EST. GFR  (NON AFRICAN AMERICAN): >60 ML/MIN/1.73 M^2
ETHANOL SERPL-MCNC: <10 MG/DL
GLUCOSE SERPL-MCNC: 140 MG/DL (ref 70–110)
GLUCOSE UR QL STRIP: NEGATIVE
HCT VFR BLD AUTO: 42.6 % (ref 37–48.5)
HGB BLD-MCNC: 14.7 G/DL (ref 12–16)
HGB UR QL STRIP: ABNORMAL
HYALINE CASTS #/AREA URNS LPF: 0 /LPF
KETONES UR QL STRIP: ABNORMAL
LEUKOCYTE ESTERASE UR QL STRIP: NEGATIVE
LIPASE SERPL-CCNC: 7 U/L (ref 4–60)
LYMPHOCYTES # BLD AUTO: 1.1 K/UL (ref 1–4.8)
LYMPHOCYTES NFR BLD: 10.6 % (ref 18–48)
MCH RBC QN AUTO: 28.3 PG (ref 27–31)
MCHC RBC AUTO-ENTMCNC: 34.5 G/DL (ref 32–36)
MCV RBC AUTO: 82 FL (ref 82–98)
METHADONE UR QL SCN>300 NG/ML: NEGATIVE
MICROSCOPIC COMMENT: ABNORMAL
MONOCYTES # BLD AUTO: 0.3 K/UL (ref 0.3–1)
MONOCYTES NFR BLD: 2.3 % (ref 4–15)
NEUTROPHILS # BLD AUTO: 9.4 K/UL (ref 1.8–7.7)
NEUTROPHILS NFR BLD: 87 % (ref 38–73)
NITRITE UR QL STRIP: NEGATIVE
OPIATES UR QL SCN: NEGATIVE
PCP UR QL SCN>25 NG/ML: NEGATIVE
PH UR STRIP: 7 [PH] (ref 5–8)
PLATELET # BLD AUTO: 259 K/UL (ref 150–350)
PMV BLD AUTO: 9.4 FL (ref 9.2–12.9)
POTASSIUM SERPL-SCNC: 4.1 MMOL/L (ref 3.5–5.1)
PROT SERPL-MCNC: 8.5 G/DL (ref 6–8.4)
PROT UR QL STRIP: ABNORMAL
RBC # BLD AUTO: 5.19 M/UL (ref 4–5.4)
RBC #/AREA URNS HPF: 6 /HPF (ref 0–4)
SODIUM SERPL-SCNC: 136 MMOL/L (ref 136–145)
SP GR UR STRIP: >=1.03 (ref 1–1.03)
SQUAMOUS #/AREA URNS HPF: 3 /HPF
TOXICOLOGY INFORMATION: NORMAL
URN SPEC COLLECT METH UR: ABNORMAL
UROBILINOGEN UR STRIP-ACNC: 1 EU/DL
WBC # BLD AUTO: 10.77 K/UL (ref 3.9–12.7)
WBC #/AREA URNS HPF: 2 /HPF (ref 0–5)

## 2019-06-16 PROCEDURE — 80307 DRUG TEST PRSMV CHEM ANLYZR: CPT

## 2019-06-16 PROCEDURE — 83690 ASSAY OF LIPASE: CPT

## 2019-06-16 PROCEDURE — 99285 EMERGENCY DEPT VISIT HI MDM: CPT | Mod: 25

## 2019-06-16 PROCEDURE — 93005 ELECTROCARDIOGRAM TRACING: CPT

## 2019-06-16 PROCEDURE — 81025 URINE PREGNANCY TEST: CPT

## 2019-06-16 PROCEDURE — 63600175 PHARM REV CODE 636 W HCPCS: Performed by: EMERGENCY MEDICINE

## 2019-06-16 PROCEDURE — 25000003 PHARM REV CODE 250: Performed by: EMERGENCY MEDICINE

## 2019-06-16 PROCEDURE — 93010 EKG 12-LEAD: ICD-10-PCS | Mod: ,,, | Performed by: INTERNAL MEDICINE

## 2019-06-16 PROCEDURE — 81000 URINALYSIS NONAUTO W/SCOPE: CPT | Mod: 59

## 2019-06-16 PROCEDURE — 80053 COMPREHEN METABOLIC PANEL: CPT

## 2019-06-16 PROCEDURE — 36415 COLL VENOUS BLD VENIPUNCTURE: CPT

## 2019-06-16 PROCEDURE — 96376 TX/PRO/DX INJ SAME DRUG ADON: CPT

## 2019-06-16 PROCEDURE — 96361 HYDRATE IV INFUSION ADD-ON: CPT

## 2019-06-16 PROCEDURE — 80320 DRUG SCREEN QUANTALCOHOLS: CPT

## 2019-06-16 PROCEDURE — 96375 TX/PRO/DX INJ NEW DRUG ADDON: CPT

## 2019-06-16 PROCEDURE — 93010 ELECTROCARDIOGRAM REPORT: CPT | Mod: ,,, | Performed by: INTERNAL MEDICINE

## 2019-06-16 PROCEDURE — 96374 THER/PROPH/DIAG INJ IV PUSH: CPT

## 2019-06-16 PROCEDURE — 25500020 PHARM REV CODE 255: Performed by: EMERGENCY MEDICINE

## 2019-06-16 PROCEDURE — 85025 COMPLETE CBC W/AUTO DIFF WBC: CPT

## 2019-06-16 RX ORDER — HYDROCODONE BITARTRATE AND ACETAMINOPHEN 10; 325 MG/1; MG/1
1 TABLET ORAL
Status: COMPLETED | OUTPATIENT
Start: 2019-06-16 | End: 2019-06-16

## 2019-06-16 RX ORDER — PROMETHAZINE HYDROCHLORIDE 25 MG/1
25 SUPPOSITORY RECTAL EVERY 6 HOURS PRN
Qty: 10 SUPPOSITORY | Refills: 0 | Status: SHIPPED | OUTPATIENT
Start: 2019-06-16

## 2019-06-16 RX ORDER — ONDANSETRON 4 MG/1
4 TABLET, ORALLY DISINTEGRATING ORAL EVERY 8 HOURS PRN
Qty: 15 TABLET | Refills: 0 | Status: SHIPPED | OUTPATIENT
Start: 2019-06-16

## 2019-06-16 RX ORDER — NALOXONE HCL 0.4 MG/ML
1 VIAL (ML) INJECTION
Status: COMPLETED | OUTPATIENT
Start: 2019-06-16 | End: 2019-06-16

## 2019-06-16 RX ORDER — CLONIDINE HYDROCHLORIDE 0.1 MG/1
0.1 TABLET ORAL
Status: DISCONTINUED | OUTPATIENT
Start: 2019-06-16 | End: 2019-06-16

## 2019-06-16 RX ORDER — ONDANSETRON 2 MG/ML
4 INJECTION INTRAMUSCULAR; INTRAVENOUS
Status: COMPLETED | OUTPATIENT
Start: 2019-06-16 | End: 2019-06-16

## 2019-06-16 RX ORDER — ONDANSETRON 2 MG/ML
4 INJECTION INTRAMUSCULAR; INTRAVENOUS
Status: DISCONTINUED | OUTPATIENT
Start: 2019-06-16 | End: 2019-06-16

## 2019-06-16 RX ORDER — HYDROMORPHONE HYDROCHLORIDE 2 MG/ML
1 INJECTION, SOLUTION INTRAMUSCULAR; INTRAVENOUS; SUBCUTANEOUS
Status: DISCONTINUED | OUTPATIENT
Start: 2019-06-16 | End: 2019-06-16

## 2019-06-16 RX ORDER — HYDRALAZINE HYDROCHLORIDE 20 MG/ML
10 INJECTION INTRAMUSCULAR; INTRAVENOUS
Status: COMPLETED | OUTPATIENT
Start: 2019-06-16 | End: 2019-06-16

## 2019-06-16 RX ORDER — KETOROLAC TROMETHAMINE 30 MG/ML
30 INJECTION, SOLUTION INTRAMUSCULAR; INTRAVENOUS
Status: DISCONTINUED | OUTPATIENT
Start: 2019-06-16 | End: 2019-06-16

## 2019-06-16 RX ORDER — HYDROCODONE BITARTRATE AND ACETAMINOPHEN 10; 325 MG/1; MG/1
1 TABLET ORAL EVERY 6 HOURS PRN
Qty: 8 TABLET | Refills: 0 | Status: SHIPPED | OUTPATIENT
Start: 2019-06-16

## 2019-06-16 RX ADMIN — NALOXONE HYDROCHLORIDE 1 MG: 0.4 INJECTION, SOLUTION INTRAMUSCULAR; INTRAVENOUS; SUBCUTANEOUS at 03:06

## 2019-06-16 RX ADMIN — IOHEXOL 75 ML: 350 INJECTION, SOLUTION INTRAVENOUS at 04:06

## 2019-06-16 RX ADMIN — LORAZEPAM 1 MG: 2 INJECTION INTRAMUSCULAR; INTRAVENOUS at 04:06

## 2019-06-16 RX ADMIN — ONDANSETRON 4 MG: 2 INJECTION INTRAMUSCULAR; INTRAVENOUS at 03:06

## 2019-06-16 RX ADMIN — SODIUM CHLORIDE 1000 ML: 0.9 INJECTION, SOLUTION INTRAVENOUS at 05:06

## 2019-06-16 RX ADMIN — ONDANSETRON 4 MG: 2 INJECTION INTRAMUSCULAR; INTRAVENOUS at 04:06

## 2019-06-16 RX ADMIN — HYDRALAZINE HYDROCHLORIDE 10 MG: 20 INJECTION, SOLUTION INTRAMUSCULAR; INTRAVENOUS at 03:06

## 2019-06-16 RX ADMIN — SODIUM CHLORIDE 1000 ML: 0.9 INJECTION, SOLUTION INTRAVENOUS at 03:06

## 2019-06-16 RX ADMIN — HYDROCODONE BITARTRATE AND ACETAMINOPHEN 1 TABLET: 10; 325 TABLET ORAL at 05:06

## 2019-06-16 NOTE — ED PROVIDER NOTES
SCRIBE #1 NOTE: I, Neil Porter/Pia Pan, am scribing for, and in the presence of, Darío Blandon Jr., MD. I have scribed the HPI, Mi ANDERSON.    SCRIBE #2 NOTE: I, Alexandra Lane, am scribing for, and in the presence of,  Evaristo Valerio Jr., MD. I have scribed the remaining portions of the note not scribed by Scribe #1.      History     Chief Complaint   Patient presents with    Nausea     pt has been having nausea and vomiting since yesterday. Pt is diaphoretic and clammy    Dehydration     Review of patient's allergies indicates:   Allergen Reactions    Doxycycline Shortness Of Breath and Swelling         History of Present Illness     HPI    2019, 2:07 PM  History obtained from the  and patient      History of Present Illness: Sanket Palmer is a 38 y.o. female patient with a PMHx of gastroparesis and HTN who presents to the Emergency Department for evaluation of nausea/vomiting which onset gradually last night. Symptoms are episodic and moderate in severity. No mitigating or exacerbating factors reported. Associated sxs include suprapubic pain and flank pain. Patient/ denies any sick contact, hematemesis, hematuria, hematochezia, diarrhea, dysuria, fever, chills, SOB, and all other sxs at this time. Patient states she takes suboxone regularly, but took less than usual today as she is waiting to find a new physician.  reports patient is diagnosed with HTN, but is not prescribed any medication for it. No further complaints or concerns at this time.     Arrival mode: Personal vehicle      PCP: Britt Regan MD        Past Medical History:  Past Medical History:   Diagnosis Date    Bipolar 1 disorder     Gastroparesis     Hypertension     Schizophrenia        Past Surgical History:  Past Surgical History:   Procedure Laterality Date     SECTION           Family History:  Family History   Problem Relation Age of Onset    Bone cancer Mother     Ovarian cancer Neg Hx      Diabetes Neg Hx        Social History:  Social History     Tobacco Use    Smoking status: Current Every Day Smoker     Packs/day: 1.50     Types: Cigarettes    Smokeless tobacco: Never Used   Substance and Sexual Activity    Alcohol use: No    Drug use: Yes     Types: Marijuana    Sexual activity: Yes     Partners: Male        Review of Systems     Review of Systems   Constitutional: Negative for chills and fever.   HENT: Negative for sore throat.    Respiratory: Negative for shortness of breath.    Cardiovascular: Negative for chest pain.   Gastrointestinal: Positive for abdominal pain (suprapubic), nausea and vomiting. Negative for blood in stool and diarrhea.   Genitourinary: Positive for flank pain. Negative for dysuria and hematuria.   Musculoskeletal: Negative for back pain.   Skin: Negative for rash.   Neurological: Negative for weakness.   Hematological: Does not bruise/bleed easily.   All other systems reviewed and are negative.     Physical Exam     Initial Vitals [06/16/19 1339]   BP Pulse Resp Temp SpO2   (!) 193/102 76 18 98.8 °F (37.1 °C) 98 %      MAP       --          Physical Exam  Nursing Notes and Vital Signs Reviewed.  Constitutional: Patient is in mild distress. Well-developed and well-nourished.  Head: Atraumatic. Normocephalic.  Eyes: Pinpoint pupils bilaterally. EOM intact. Conjunctivae are not pale. No scleral icterus.  ENT: Mucous membranes are moist. Oropharynx is clear and symmetric.    Neck: Supple. Full ROM. No lymphadenopathy.  Cardiovascular: Regular rate. Regular rhythm. No murmurs, rubs, or gallops. Distal pulses are 2+ and symmetric.  Pulmonary/Chest: No respiratory distress. Clear to auscultation bilaterally. No wheezing or rales.  Abdominal: Soft and non-distended.  Mild suprapubic tenderness.  No rebound, guarding, or rigidity.   Genitourinary: No CVA tenderness  Musculoskeletal: Moves all extremities. No obvious deformities. No edema.   Skin: Warm and dry.  Neurological:   "Drowsy. Answers questions appropriately.  Normal speech.  No acute focal neurological deficits are appreciated. GCS 15.  Psychiatric: Normal affect. Good eye contact. Appropriate in content.     ED Course   Procedures  ED Vital Signs:  Vitals:    06/16/19 1339 06/16/19 1358 06/16/19 1501 06/16/19 1539   BP: (!) 193/102  (!) 203/106 (!) 206/107   Pulse: 76  73 73   Resp: 18  (!) 24 20   Temp: 98.8 °F (37.1 °C)      TempSrc: Oral      SpO2: 98%      Weight:  79.8 kg (175 lb 14.4 oz)     Height: 5' 5" (1.651 m)       06/16/19 1645 06/16/19 1714   BP: (!) 146/87 (!) 174/94   Pulse: 106 101   Resp: (!) 24 (!) 26   Temp:     TempSrc:     SpO2:     Weight:     Height:         Abnormal Lab Results:  Labs Reviewed   CBC W/ AUTO DIFFERENTIAL - Abnormal; Notable for the following components:       Result Value    Gran # (ANC) 9.4 (*)     Gran% 87.0 (*)     Lymph% 10.6 (*)     Mono% 2.3 (*)     All other components within normal limits   COMPREHENSIVE METABOLIC PANEL - Abnormal; Notable for the following components:    CO2 19 (*)     Glucose 140 (*)     Total Protein 8.5 (*)     All other components within normal limits   URINALYSIS, REFLEX TO URINE CULTURE - Abnormal; Notable for the following components:    Specific Gravity, UA >=1.030 (*)     Protein, UA 2+ (*)     Ketones, UA 1+ (*)     Occult Blood UA 3+ (*)     All other components within normal limits    Narrative:     Preferred Collection Type->Urine, Clean Catch   URINALYSIS MICROSCOPIC - Abnormal; Notable for the following components:    RBC, UA 6 (*)     All other components within normal limits    Narrative:     Preferred Collection Type->Urine, Clean Catch   LIPASE   PREGNANCY TEST, URINE RAPID   DRUG SCREEN PANEL, URINE EMERGENCY   ALCOHOL,MEDICAL (ETHANOL)   ALCOHOL,MEDICAL (ETHANOL)        All Lab Results:  Results for orders placed or performed during the hospital encounter of 06/16/19   CBC W/ AUTO DIFFERENTIAL   Result Value Ref Range    WBC 10.77 3.90 - " 12.70 K/uL    RBC 5.19 4.00 - 5.40 M/uL    Hemoglobin 14.7 12.0 - 16.0 g/dL    Hematocrit 42.6 37.0 - 48.5 %    Mean Corpuscular Volume 82 82 - 98 fL    Mean Corpuscular Hemoglobin 28.3 27.0 - 31.0 pg    Mean Corpuscular Hemoglobin Conc 34.5 32.0 - 36.0 g/dL    RDW 13.7 11.5 - 14.5 %    Platelets 259 150 - 350 K/uL    MPV 9.4 9.2 - 12.9 fL    Gran # (ANC) 9.4 (H) 1.8 - 7.7 K/uL    Lymph # 1.1 1.0 - 4.8 K/uL    Mono # 0.3 0.3 - 1.0 K/uL    Eos # 0.0 0.0 - 0.5 K/uL    Baso # 0.01 0.00 - 0.20 K/uL    Gran% 87.0 (H) 38.0 - 73.0 %    Lymph% 10.6 (L) 18.0 - 48.0 %    Mono% 2.3 (L) 4.0 - 15.0 %    Eosinophil% 0.0 0.0 - 8.0 %    Basophil% 0.1 0.0 - 1.9 %    Differential Method Automated    Comp. Metabolic Panel   Result Value Ref Range    Sodium 136 136 - 145 mmol/L    Potassium 4.1 3.5 - 5.1 mmol/L    Chloride 104 95 - 110 mmol/L    CO2 19 (L) 23 - 29 mmol/L    Glucose 140 (H) 70 - 110 mg/dL    BUN, Bld 9 6 - 20 mg/dL    Creatinine 0.8 0.5 - 1.4 mg/dL    Calcium 9.4 8.7 - 10.5 mg/dL    Total Protein 8.5 (H) 6.0 - 8.4 g/dL    Albumin 4.2 3.5 - 5.2 g/dL    Total Bilirubin 0.3 0.1 - 1.0 mg/dL    Alkaline Phosphatase 74 55 - 135 U/L    AST 21 10 - 40 U/L    ALT 12 10 - 44 U/L    Anion Gap 13 8 - 16 mmol/L    eGFR if African American >60 >60 mL/min/1.73 m^2    eGFR if non African American >60 >60 mL/min/1.73 m^2   Lipase   Result Value Ref Range    Lipase 7 4 - 60 U/L   Urinalysis, Reflex to Urine Culture Urine, Clean Catch   Result Value Ref Range    Specimen UA Urine, Clean Catch     Color, UA Yellow Yellow, Straw, Wendy    Appearance, UA Clear Clear    pH, UA 7.0 5.0 - 8.0    Specific Gravity, UA >=1.030 (A) 1.005 - 1.030    Protein, UA 2+ (A) Negative    Glucose, UA Negative Negative    Ketones, UA 1+ (A) Negative    Bilirubin (UA) Negative Negative    Occult Blood UA 3+ (A) Negative    Nitrite, UA Negative Negative    Urobilinogen, UA 1.0 <2.0 EU/dL    Leukocytes, UA Negative Negative   Pregnancy, urine rapid   Result  Value Ref Range    Preg Test, Ur Negative    Drug screen panel, emergency   Result Value Ref Range    Benzodiazepines Negative     Methadone metabolites Negative     Cocaine (Metab.) Negative     Opiate Scrn, Ur Negative     Barbiturate Screen, Ur Negative     Amphetamine Screen, Ur Negative     THC Presumptive Positive     Phencyclidine Negative     Creatinine, Random Ur 168.5 15.0 - 325.0 mg/dL    Toxicology Information SEE COMMENT    Ethanol   Result Value Ref Range    Alcohol, Medical, Serum <10 <10 mg/dL   Urinalysis Microscopic   Result Value Ref Range    RBC, UA 6 (H) 0 - 4 /hpf    WBC, UA 2 0 - 5 /hpf    Bacteria Occasional None-Occ /hpf    Squam Epithel, UA 3 /hpf    Hyaline Casts, UA 0 0-1/lpf /lpf    Microscopic Comment SEE COMMENT          Imaging Results:  Imaging Results          CT Abdomen Pelvis With Contrast (Final result)  Result time 06/16/19 16:47:55    Final result by J Luis Sheikh Jr., MD (06/16/19 16:47:55)                 Impression:      No acute findings evident.  As above.    All CT scans at this facility are performed  using dose modulation techniques as appropriate to performed exam including the following:  automated exposure control; adjustment of mA and/or kV according to the patients size (this includes techniques or standardized protocols for targeted exams where dose is matched to indication/reason for exam: i.e. extremities or head);  iterative reconstruction technique.      Electronically signed by: J Luis Sheikh  Date:    06/16/2019  Time:    16:47             Narrative:    EXAMINATION:  CT ABDOMEN PELVIS WITH CONTRAST    CLINICAL HISTORY:  Nausea, vomiting, diarrhea;    TECHNIQUE:  Postcontrast axial images of the abdomen and pelvis were obtained. Seventy-five ml of Omnipaque 350 was administered intravenously.  Multiplanar reconstruction images were produced.  Motion artifact    COMPARISON:  07/02/2013    FINDINGS:  No acute lung base findings.  Gallbladder, liver,  spleen, pancreas, adrenal glands, aorta are unremarkable.    No renal calculi.  No obstructive uropathy.  No suspicious kidney mass can be detected.  Urinary bladder appears unremarkable.  Uterus is intact.  No suspicious adnexal findings can be detected.    No oral contrast material.  No acute small intestine findings.  No intestinal obstruction.  Normal terminal ileum.  The appendix is not seen.  No appendicitis changes.  No acute colitis or active diverticulitis.    No ascites.  No abscess.  No significant adenopathy.                               CT Head Without Contrast (Final result)  Result time 06/16/19 16:45:02    Final result by Matt Choi Jr., MD (06/16/19 16:45:02)                 Impression:      1. No acute findings within limits of motion artifact.  2. 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  All CT scans at this facility use dose modulation, iterative reconstruction, and/or weight base dosing when appropriate to reduce radiation dose to as low as reasonably achievable.      Electronically signed by: Matt Choi Jr., MD  Date:    06/16/2019  Time:    16:45             Narrative:    EXAMINATION:  CT HEAD WITHOUT CONTRAST    CLINICAL HISTORY:  Disorientation, unspecifiedConfusion/delirium, altered LOC, unexplained;    TECHNIQUE:  Contiguous axial images were obtained from the skull base through the vertex without intravenous contrast.    COMPARISON:  None    FINDINGS:  Slightly compromised exam due to motion artifact.  No definite intracranial hemorrhage.  No mass effect or midline shift. Normal parenchymal attenuation.  Mild tonsillar ectopia.  The ventricles and sulci are normal in size and configuration. The paranasal sinuses and mastoid air cells are clear.  No concerning osseous findings.                               X-Ray Abdomen Flat And Erect (Final result)  Result time 06/16/19 16:35:09    Final result by J Luis Sheikh Jr., MD (06/16/19 16:35:09)                  Impression:      As above      Electronically signed by: J Luis Sheikh  Date:    06/16/2019  Time:    16:35             Narrative:    EXAMINATION:  XR ABDOMEN FLAT AND ERECT    CLINICAL HISTORY:  Abdominal Pain;    COMPARISON:  09/20/2016    FINDINGS:  No free air.  No dilated large or small bowel loops.  Nothing to indicate intestinal obstruction.  No obvious soft tissue mass.  Scattered areas of hypodensity in the left upper abdomen.  Etiology is indeterminate.  These may represent bowel contents.  Left renal calculi cannot be excluded.                                 The EKG was ordered, reviewed, and independently interpreted by the ED provider.  Interpretation time: 1357  Rate: 71 BPM  Rhythm: normal sinus rhythm  Interpretation: Normal ECG. No acute ST abnormality. No STEMI.         The Emergency Provider reviewed the vital signs and test results, which are outlined above.     ED Discussion     4:00 PM: Dr. Blandon transfers care of pt to Dr. Valerio pending lab/imaging results.    6:25 PM: Patient has tolerated liquids without difficulty. BP has improved.     6:28 PM: Reassessed pt at this time. Discussed with pt all pertinent ED information and results. Discussed pt dx and plan of tx. Gave pt all f/u and return to the ED instructions. All questions and concerns were addressed at this time. Pt expresses understanding of information and instructions, and is comfortable with plan to discharge. Pt is stable for discharge.    I discussed with patient and/or family/caretaker that evaluation in the ED does not suggest any emergent or life threatening medical conditions requiring immediate intervention beyond what was provided in the ED, and I believe patient is safe for discharge.  Regardless, an unremarkable evaluation in the ED does not preclude the development or presence of a serious of life threatening condition. As such, patient was instructed to return immediately for any worsening or change in current  symptoms.        ED Medication(s):  Medications   hydrALAZINE injection 10 mg (10 mg Intravenous Given 6/16/19 1538)   ondansetron injection 4 mg (4 mg Intravenous Given 6/16/19 1551)   sodium chloride 0.9% bolus 1,000 mL (0 mLs Intravenous Stopped 6/16/19 1717)   naloxone 0.4 mg/mL injection 1 mg (1 mg Intravenous Given 6/16/19 1559)   iohexol (OMNIPAQUE 350) injection 100 mL (75 mLs Intravenous Given 6/16/19 1638)   lorazepam (ATIVAN) injection 1 mg (1 mg Intravenous Given 6/16/19 1621)   ondansetron injection 4 mg (4 mg Intravenous Given 6/16/19 1620)   sodium chloride 0.9% bolus 1,000 mL (1,000 mLs Intravenous New Bag 6/16/19 1715)   HYDROcodone-acetaminophen  mg per tablet 1 tablet (1 tablet Oral Given 6/16/19 1706)       New Prescriptions    No medications on file       Medical Decision Making:   Clinical Tests:   Lab Tests: Ordered and Reviewed  Radiological Study: Ordered and Reviewed  Medical Tests: Ordered and Reviewed         Scribe Attestation:   Scribe #1: I performed the above scribed service and the documentation accurately describes the services I performed. I attest to the accuracy of the note.     Attending:   Physician Attestation Statement for Scribe #1: I, Darío Blandon Jr., MD, personally performed the services described in this documentation, as scribed by Neil Porter/Pia Pan, in my presence, and it is both accurate and complete.       Scribe Attestation:   Scribe #2: I performed the above scribed service and the documentation accurately describes the services I performed. I attest to the accuracy of the note.    Attending Attestation:           Physician Attestation for Scribe:    Physician Attestation Statement for Scribe #2: I, Evaristo Valerio Jr., MD, reviewed documentation, as scribed by Alxeandra Lane in my presence, and it is both accurate and complete. I also acknowledge and confirm the content of the note done by Scribe #1.           Clinical Impression       ICD-10-CM  ICD-9-CM   1. Bipolar 1 disorder on seroquel, remeron F31.9 296.7   2. Nausea & vomiting R11.2 787.01   3. Confusion R41.0 298.9   4. Gastroparesis K31.84 536.3   5. Non-intractable vomiting with nausea, unspecified vomiting type R11.2 787.01       Disposition:   Disposition: Discharged  Condition: Stable         Evaristo Valerio Jr., MD  06/16/19 [id number]

## 2019-06-16 NOTE — ED NOTES
Patient stated that she ran out of suboxone and that she may be going through withdrawals, MD notified.